# Patient Record
Sex: MALE | Race: BLACK OR AFRICAN AMERICAN | NOT HISPANIC OR LATINO | Employment: FULL TIME | URBAN - METROPOLITAN AREA
[De-identification: names, ages, dates, MRNs, and addresses within clinical notes are randomized per-mention and may not be internally consistent; named-entity substitution may affect disease eponyms.]

---

## 2024-09-09 ENCOUNTER — APPOINTMENT (EMERGENCY)
Dept: RADIOLOGY | Facility: HOSPITAL | Age: 34
DRG: 390 | End: 2024-09-09
Payer: COMMERCIAL

## 2024-09-09 ENCOUNTER — HOSPITAL ENCOUNTER (INPATIENT)
Facility: HOSPITAL | Age: 34
LOS: 1 days | Discharge: ANOTHER HOSPITAL | DRG: 390 | End: 2024-09-10
Attending: EMERGENCY MEDICINE | Admitting: SURGERY
Payer: COMMERCIAL

## 2024-09-09 DIAGNOSIS — K56.609 SMALL BOWEL OBSTRUCTION (HCC): Primary | ICD-10-CM

## 2024-09-09 DIAGNOSIS — Z90.49 S/P PARTIAL RESECTION OF COLON: ICD-10-CM

## 2024-09-09 LAB
ANION GAP SERPL CALCULATED.3IONS-SCNC: 6 MMOL/L (ref 4–13)
BASOPHILS # BLD AUTO: 0.05 THOUSANDS/ÂΜL (ref 0–0.1)
BASOPHILS NFR BLD AUTO: 1 % (ref 0–1)
BUN SERPL-MCNC: 10 MG/DL (ref 5–25)
CALCIUM SERPL-MCNC: 10 MG/DL (ref 8.4–10.2)
CHLORIDE SERPL-SCNC: 101 MMOL/L (ref 96–108)
CO2 SERPL-SCNC: 28 MMOL/L (ref 21–32)
CREAT SERPL-MCNC: 1.16 MG/DL (ref 0.6–1.3)
EOSINOPHIL # BLD AUTO: 0.03 THOUSAND/ÂΜL (ref 0–0.61)
EOSINOPHIL NFR BLD AUTO: 0 % (ref 0–6)
ERYTHROCYTE [DISTWIDTH] IN BLOOD BY AUTOMATED COUNT: 14 % (ref 11.6–15.1)
GFR SERPL CREATININE-BSD FRML MDRD: 81 ML/MIN/1.73SQ M
GLUCOSE SERPL-MCNC: 102 MG/DL (ref 65–140)
HCT VFR BLD AUTO: 42.5 % (ref 36.5–49.3)
HGB BLD-MCNC: 13.6 G/DL (ref 12–17)
IMM GRANULOCYTES # BLD AUTO: 0.02 THOUSAND/UL (ref 0–0.2)
IMM GRANULOCYTES NFR BLD AUTO: 0 % (ref 0–2)
LACTATE SERPL-SCNC: 0.6 MMOL/L (ref 0.5–2)
LYMPHOCYTES # BLD AUTO: 1.22 THOUSANDS/ÂΜL (ref 0.6–4.47)
LYMPHOCYTES NFR BLD AUTO: 12 % (ref 14–44)
MCH RBC QN AUTO: 23.4 PG (ref 26.8–34.3)
MCHC RBC AUTO-ENTMCNC: 32 G/DL (ref 31.4–37.4)
MCV RBC AUTO: 73 FL (ref 82–98)
MONOCYTES # BLD AUTO: 0.72 THOUSAND/ÂΜL (ref 0.17–1.22)
MONOCYTES NFR BLD AUTO: 7 % (ref 4–12)
NEUTROPHILS # BLD AUTO: 7.95 THOUSANDS/ÂΜL (ref 1.85–7.62)
NEUTS SEG NFR BLD AUTO: 80 % (ref 43–75)
NRBC BLD AUTO-RTO: 0 /100 WBCS
PLATELET # BLD AUTO: 200 THOUSANDS/UL (ref 149–390)
PMV BLD AUTO: 10.5 FL (ref 8.9–12.7)
POTASSIUM SERPL-SCNC: 4.8 MMOL/L (ref 3.5–5.3)
RBC # BLD AUTO: 5.82 MILLION/UL (ref 3.88–5.62)
SODIUM SERPL-SCNC: 135 MMOL/L (ref 135–147)
WBC # BLD AUTO: 9.99 THOUSAND/UL (ref 4.31–10.16)

## 2024-09-09 PROCEDURE — 96375 TX/PRO/DX INJ NEW DRUG ADDON: CPT

## 2024-09-09 PROCEDURE — 80048 BASIC METABOLIC PNL TOTAL CA: CPT | Performed by: EMERGENCY MEDICINE

## 2024-09-09 PROCEDURE — 99285 EMERGENCY DEPT VISIT HI MDM: CPT | Performed by: EMERGENCY MEDICINE

## 2024-09-09 PROCEDURE — 99284 EMERGENCY DEPT VISIT MOD MDM: CPT

## 2024-09-09 PROCEDURE — 36415 COLL VENOUS BLD VENIPUNCTURE: CPT | Performed by: EMERGENCY MEDICINE

## 2024-09-09 PROCEDURE — 83605 ASSAY OF LACTIC ACID: CPT | Performed by: PHYSICIAN ASSISTANT

## 2024-09-09 PROCEDURE — 74177 CT ABD & PELVIS W/CONTRAST: CPT

## 2024-09-09 PROCEDURE — 99223 1ST HOSP IP/OBS HIGH 75: CPT | Performed by: SPECIALIST

## 2024-09-09 PROCEDURE — 85025 COMPLETE CBC W/AUTO DIFF WBC: CPT | Performed by: EMERGENCY MEDICINE

## 2024-09-09 PROCEDURE — 96374 THER/PROPH/DIAG INJ IV PUSH: CPT

## 2024-09-09 RX ORDER — ONDANSETRON 2 MG/ML
4 INJECTION INTRAMUSCULAR; INTRAVENOUS EVERY 6 HOURS PRN
Status: DISCONTINUED | OUTPATIENT
Start: 2024-09-09 | End: 2024-09-11 | Stop reason: HOSPADM

## 2024-09-09 RX ORDER — ONDANSETRON 2 MG/ML
4 INJECTION INTRAMUSCULAR; INTRAVENOUS ONCE
Status: COMPLETED | OUTPATIENT
Start: 2024-09-09 | End: 2024-09-09

## 2024-09-09 RX ORDER — HYDROMORPHONE HCL/PF 1 MG/ML
0.5 SYRINGE (ML) INJECTION EVERY 4 HOURS PRN
Status: DISCONTINUED | OUTPATIENT
Start: 2024-09-09 | End: 2024-09-11 | Stop reason: HOSPADM

## 2024-09-09 RX ORDER — SODIUM CHLORIDE, SODIUM GLUCONATE, SODIUM ACETATE, POTASSIUM CHLORIDE, MAGNESIUM CHLORIDE, SODIUM PHOSPHATE, DIBASIC, AND POTASSIUM PHOSPHATE .53; .5; .37; .037; .03; .012; .00082 G/100ML; G/100ML; G/100ML; G/100ML; G/100ML; G/100ML; G/100ML
125 INJECTION, SOLUTION INTRAVENOUS CONTINUOUS
Status: DISCONTINUED | OUTPATIENT
Start: 2024-09-09 | End: 2024-09-11 | Stop reason: HOSPADM

## 2024-09-09 RX ORDER — MORPHINE SULFATE 4 MG/ML
4 INJECTION, SOLUTION INTRAMUSCULAR; INTRAVENOUS ONCE
Status: COMPLETED | OUTPATIENT
Start: 2024-09-09 | End: 2024-09-09

## 2024-09-09 RX ORDER — SODIUM CHLORIDE 9 MG/ML
100 INJECTION, SOLUTION INTRAVENOUS CONTINUOUS
Status: DISCONTINUED | OUTPATIENT
Start: 2024-09-09 | End: 2024-09-09

## 2024-09-09 RX ORDER — MORPHINE SULFATE 4 MG/ML
4 INJECTION, SOLUTION INTRAMUSCULAR; INTRAVENOUS ONCE
Status: DISCONTINUED | OUTPATIENT
Start: 2024-09-09 | End: 2024-09-09

## 2024-09-09 RX ORDER — HYDROMORPHONE HCL IN WATER/PF 6 MG/30 ML
0.2 PATIENT CONTROLLED ANALGESIA SYRINGE INTRAVENOUS EVERY 4 HOURS PRN
Status: DISCONTINUED | OUTPATIENT
Start: 2024-09-09 | End: 2024-09-11 | Stop reason: HOSPADM

## 2024-09-09 RX ORDER — ACETAMINOPHEN 10 MG/ML
1000 INJECTION, SOLUTION INTRAVENOUS EVERY 6 HOURS PRN
Status: DISCONTINUED | OUTPATIENT
Start: 2024-09-09 | End: 2024-09-11 | Stop reason: HOSPADM

## 2024-09-09 RX ORDER — ENOXAPARIN SODIUM 100 MG/ML
40 INJECTION SUBCUTANEOUS DAILY
Status: DISCONTINUED | OUTPATIENT
Start: 2024-09-09 | End: 2024-09-11 | Stop reason: HOSPADM

## 2024-09-09 RX ADMIN — SODIUM CHLORIDE 100 ML/HR: 0.9 INJECTION, SOLUTION INTRAVENOUS at 06:17

## 2024-09-09 RX ADMIN — MORPHINE SULFATE 4 MG: 4 INJECTION INTRAVENOUS at 04:23

## 2024-09-09 RX ADMIN — HYDROMORPHONE HYDROCHLORIDE 0.2 MG: 0.2 INJECTION, SOLUTION INTRAMUSCULAR; INTRAVENOUS; SUBCUTANEOUS at 20:58

## 2024-09-09 RX ADMIN — IOHEXOL 100 ML: 350 INJECTION, SOLUTION INTRAVENOUS at 03:57

## 2024-09-09 RX ADMIN — ACETAMINOPHEN 1000 MG: 10 INJECTION INTRAVENOUS at 12:08

## 2024-09-09 RX ADMIN — SODIUM CHLORIDE, SODIUM GLUCONATE, SODIUM ACETATE, POTASSIUM CHLORIDE, MAGNESIUM CHLORIDE, SODIUM PHOSPHATE, DIBASIC, AND POTASSIUM PHOSPHATE 125 ML/HR: .53; .5; .37; .037; .03; .012; .00082 INJECTION, SOLUTION INTRAVENOUS at 20:49

## 2024-09-09 RX ADMIN — ONDANSETRON 4 MG: 2 INJECTION INTRAMUSCULAR; INTRAVENOUS at 20:50

## 2024-09-09 RX ADMIN — SODIUM CHLORIDE, SODIUM GLUCONATE, SODIUM ACETATE, POTASSIUM CHLORIDE, MAGNESIUM CHLORIDE, SODIUM PHOSPHATE, DIBASIC, AND POTASSIUM PHOSPHATE 125 ML/HR: .53; .5; .37; .037; .03; .012; .00082 INJECTION, SOLUTION INTRAVENOUS at 12:08

## 2024-09-09 RX ADMIN — ONDANSETRON 4 MG: 2 INJECTION INTRAMUSCULAR; INTRAVENOUS at 02:25

## 2024-09-09 RX ADMIN — ONDANSETRON 4 MG: 2 INJECTION INTRAMUSCULAR; INTRAVENOUS at 12:51

## 2024-09-09 RX ADMIN — MORPHINE SULFATE 2 MG: 2 INJECTION, SOLUTION INTRAMUSCULAR; INTRAVENOUS at 08:28

## 2024-09-09 RX ADMIN — HYDROMORPHONE HYDROCHLORIDE 0.5 MG: 1 INJECTION, SOLUTION INTRAMUSCULAR; INTRAVENOUS; SUBCUTANEOUS at 18:32

## 2024-09-09 NOTE — ED NOTES
Patient received in room, resting, awaiting bed on unit upstairs.Patient refused NGT for night shift nurse, and doctors are aware.     Casie Martin RN  09/09/24 3655

## 2024-09-09 NOTE — PLAN OF CARE
Problem: PAIN - ADULT  Goal: Verbalizes/displays adequate comfort level or baseline comfort level  Description: Interventions:  - Encourage patient to monitor pain and request assistance  - Assess pain using appropriate pain scale  - Administer analgesics based on type and severity of pain and evaluate response  - Implement non-pharmacological measures as appropriate and evaluate response  - Consider cultural and social influences on pain and pain management  - Notify physician/advanced practitioner if interventions unsuccessful or patient reports new pain  Outcome: Progressing     Problem: SAFETY ADULT  Goal: Patient will remain free of falls  Description: INTERVENTIONS:  - Educate patient/family on patient safety including physical limitations  - Instruct patient to call for assistance with activity   - Consult OT/PT to assist with strengthening/mobility   - Keep Call bell within reach  - Keep bed low and locked with side rails adjusted as appropriate  - Keep care items and personal belongings within reach  - Initiate and maintain comfort rounds  - Make Fall Risk Sign visible to staff  - Offer Toileting every 2 Hours, in advance of need  - Obtain necessary fall risk management equipment: Call bell  - Apply yellow socks and bracelet for high fall risk patients  - Consider moving patient to room near nurses station  Outcome: Progressing  Goal: Maintain or return to baseline ADL function  Description: INTERVENTIONS:  -  Assess patient's ability to carry out ADLs; assess patient's baseline for ADL function and identify physical deficits which impact ability to perform ADLs (bathing, care of mouth/teeth, toileting, grooming, dressing, etc.)  - Assess/evaluate cause of self-care deficits   - Assess range of motion  - Assess patient's mobility; develop plan if impaired  - Assess patient's need for assistive devices and provide as appropriate  - Encourage maximum independence but intervene and supervise when  necessary  - Involve family in performance of ADLs  - Assess for home care needs following discharge   - Consider OT consult to assist with ADL evaluation and planning for discharge  - Provide patient education as appropriate  Outcome: Progressing  Goal: Maintains/Returns to pre admission functional level  Description: INTERVENTIONS:  - Perform AM-PAC 6 Click Basic Mobility/ Daily Activity assessment daily.  - Set and communicate daily mobility goal to care team and patient/family/caregiver.   - Collaborate with rehabilitation services on mobility goals if consulted  - Out of bed for toileting  - Record patient progress and toleration of activity level   Outcome: Progressing     Problem: DISCHARGE PLANNING  Goal: Discharge to home or other facility with appropriate resources  Description: INTERVENTIONS:  - Identify barriers to discharge w/patient and caregiver  - Arrange for needed discharge resources and transportation as appropriate  - Identify discharge learning needs (meds, wound care, etc.)  - Arrange for interpretive services to assist at discharge as needed  - Refer to Case Management Department for coordinating discharge planning if the patient needs post-hospital services based on physician/advanced practitioner order or complex needs related to functional status, cognitive ability, or social support system  Outcome: Progressing     Problem: Knowledge Deficit  Goal: Patient/family/caregiver demonstrates understanding of disease process, treatment plan, medications, and discharge instructions  Description: Complete learning assessment and assess knowledge base.  Interventions:  - Provide teaching at level of understanding  - Provide teaching via preferred learning methods  Outcome: Progressing

## 2024-09-09 NOTE — ED PROVIDER NOTES
History  Chief Complaint   Patient presents with    Abdominal Pain     Started with l sided abd pain around 1800 with nausea and vomiting, had colon surgery 2 years ago. Hx of sbo, sts feels like he has another obstruction. No pain meds taken     34-year-old male past medical history significant for colon resection status post as well as history of small bowel obstructions presenting to ED today with left-sided pain that started around 6 PM.  He was also having nausea and dry heaving.  Says that it feels like he is having another obstruction.  Did not take any pain meds prior to coming in.        None       No past medical history on file.    No past surgical history on file.    No family history on file.  I have reviewed and agree with the history as documented.    E-Cigarette/Vaping     E-Cigarette/Vaping Substances    Nicotine Yes      Social History     Tobacco Use    Smoking status: Never   Substance Use Topics    Alcohol use: Yes     Comment: occ    Drug use: Not Currently     Types: Marijuana       Review of Systems   Constitutional:  Negative for chills and fever.   HENT:  Negative for hearing loss.    Eyes:  Negative for visual disturbance.   Respiratory:  Negative for shortness of breath.    Cardiovascular:  Negative for chest pain.   Gastrointestinal:  Positive for abdominal pain, nausea and vomiting. Negative for constipation and diarrhea.   Genitourinary:  Negative for difficulty urinating.   Musculoskeletal:  Negative for myalgias.   Skin:  Negative for rash.   Neurological:  Negative for dizziness.   Psychiatric/Behavioral:  Negative for agitation.        Physical Exam  Physical Exam  Vitals and nursing note reviewed.   Constitutional:       General: He is not in acute distress.     Appearance: Normal appearance. He is not ill-appearing.   HENT:      Head: Normocephalic and atraumatic.      Right Ear: External ear normal.      Left Ear: External ear normal.      Nose: Nose normal. No congestion.       Mouth/Throat:      Mouth: Mucous membranes are moist.      Pharynx: Oropharynx is clear. No oropharyngeal exudate.   Eyes:      General:         Right eye: No discharge.         Left eye: No discharge.      Extraocular Movements: Extraocular movements intact.      Conjunctiva/sclera: Conjunctivae normal.      Pupils: Pupils are equal, round, and reactive to light.   Cardiovascular:      Rate and Rhythm: Normal rate and regular rhythm.      Pulses: Normal pulses.      Heart sounds: Normal heart sounds.   Pulmonary:      Effort: Pulmonary effort is normal. No respiratory distress.      Breath sounds: Normal breath sounds. No wheezing.   Abdominal:      General: Abdomen is flat. Bowel sounds are normal. There is no distension.      Palpations: Abdomen is soft.      Tenderness: There is abdominal tenderness in the left upper quadrant. There is no guarding or rebound.   Musculoskeletal:         General: No swelling or deformity. Normal range of motion.      Cervical back: Normal range of motion. No rigidity.   Skin:     General: Skin is warm and dry.      Capillary Refill: Capillary refill takes less than 2 seconds.   Neurological:      General: No focal deficit present.      Mental Status: He is alert and oriented to person, place, and time. Mental status is at baseline.      Cranial Nerves: No cranial nerve deficit.      Motor: No weakness.      Gait: Gait normal.   Psychiatric:         Mood and Affect: Mood normal.         Behavior: Behavior normal.         Vital Signs  ED Triage Vitals [09/09/24 0216]   Temperature Pulse Respirations Blood Pressure SpO2   98.2 °F (36.8 °C) (!) 53 18 133/61 100 %      Temp Source Heart Rate Source Patient Position - Orthostatic VS BP Location FiO2 (%)   Oral Monitor Lying Left arm --      Pain Score       4           Vitals:    09/09/24 0216   BP: 133/61   Pulse: (!) 53   Patient Position - Orthostatic VS: Lying         Visual Acuity      ED Medications  Medications   ondansetron  (ZOFRAN) injection 4 mg (4 mg Intravenous Given 9/9/24 0225)   iohexol (OMNIPAQUE) 350 MG/ML injection (MULTI-DOSE) 100 mL (100 mL Intravenous Given 9/9/24 5867)   morphine injection 4 mg (4 mg Intravenous Given 9/9/24 1883)       Diagnostic Studies  Results Reviewed       Procedure Component Value Units Date/Time    Basic metabolic panel [[id number]] Collected: 09/09/24 0224    Lab Status: Final result Specimen: Blood from Arm, Right Updated: 09/09/24 0255     Sodium 135 mmol/L      Potassium 4.8 mmol/L      Chloride 101 mmol/L      CO2 28 mmol/L      ANION GAP 6 mmol/L      BUN 10 mg/dL      Creatinine 1.16 mg/dL      Glucose 102 mg/dL      Calcium 10.0 mg/dL      eGFR 81 ml/min/1.73sq m     Narrative:      National Kid[employer] Foundation guidelines for Chronic Kidney Disease (CKD):     Stage 1 with normal or high GFR (GFR > 90 mL/min/1.73 square meters)    Stage 2 Mild CKD (GFR = 60-89 mL/min/1.73 square meters)    Stage 3A Moderate CKD (GFR = 45-59 mL/min/1.73 square meters)    Stage 3B Moderate CKD (GFR = 30-44 mL/min/1.73 square meters)    Stage 4 Severe CKD (GFR = 15-29 mL/min/1.73 square meters)    Stage 5 End Stage CKD (GFR <15 mL/min/1.73 square meters)  Note: GFR calculation is accurate only with a steady state creatinine    CBC and differential [[id number]]  (Abnormal) Collected: 09/09/24 0224    Lab Status: Final result Specimen: Blood from Arm, Right Updated: 09/09/24 0229     WBC 9.99 Thousand/uL      RBC 5.82 Million/uL      Hemoglobin 13.6 g/dL      Hematocrit 42.5 %      MCV 73 fL      MCH 23.4 pg      MCHC 32.0 g/dL      RDW 14.0 %      MPV 10.5 fL      Platelets 200 Thousands/uL      nRBC 0 /100 WBCs      Segmented % 80 %      Immature Grans % 0 %      Lymphocytes % 12 %      Monocytes % 7 %      Eosinophils Relative 0 %      Basophils Relative 1 %      Absolute Neutrophils 7.95 Thousands/µL      Absolute Immature Grans 0.02 Thousand/uL      Absolute Lymphocytes 1.22 Thousands/µL      Absolute  Monocytes 0.72 Thousand/µL      Eosinophils Absolute 0.03 Thousand/µL      Basophils Absolute 0.05 Thousands/µL                    CT abdomen pelvis with contrast   ED Interpretation by Edd Serna MD (09/09 0410)   Abnormal   Per my interpretation, Dilated bowel loops concerned for SBO. Will await formal RADs Reading.      Final Result by Nolvia Zayas MD (09/09 0529)      Small bowel obstruction, as described above. Please see discussion. The appearance is concerning for possible closed loop obstruction, possibly related to internal hernia and/or volvulus. Surgical consultation and follow-up is recommended.         I personally discussed this study with EDD SERNA on 9/9/2024 5:14 AM.               Workstation performed: [id number]                    Procedures  Procedures         ED Course  ED Course as of 09/09/24 0543   Mon Sep 09, 2024   0517 Rads Call with Dr. Zayas: Small Bowel obstruction loops measuring up to 4cm diameter with a transition zone left of midline mid abdomen. Swirling of the mesenteric vessels Internal hernia or volvus. With free fluid. No pneumatosis                                  SBIRT 20yo+      Flowsheet Row Most Recent Value   Initial Alcohol Screen: US AUDIT-C     1. How often do you have a drink containing alcohol? 0 Filed at: 09/09/2024 0219   2. How many drinks containing alcohol do you have on a typical day you are drinking?  0 Filed at: 09/09/2024 0408   3a. Male UNDER 65: How often do you have five or more drinks on one occasion? 0 Filed at: 09/09/2024 0408   Audit-C Score 0 Filed at: 09/09/2024 0408   JOURDAN: How many times in the past year have you...    Used an illegal drug or used a prescription medication for non-medical reasons? Never Filed at: 09/09/2024 0408                      Medical Decision Making  34-year-old male presenting to ED today with left upper quadrant abdominal pain.  Differential includes gastritis versus small bowel obstruction versus abdominal  cramping secondary to viral process.  Will evaluate this time with a CBC, BMP, CT and pelvis with IV contrast.  Will give IV Zofran for nausea as well as IV morphine for pain.    Amount and/or Complexity of Data Reviewed  Labs: ordered.  Radiology: ordered and independent interpretation performed.    Risk  Prescription drug management.  Decision regarding hospitalization.                 Disposition  Final diagnoses:   Small bowel obstruction (HCC)   S/P partial resection of colon     Time reflects when diagnosis was documented in both MDM as applicable and the Disposition within this note       Time User Action Codes Description Comment    9/9/2024  5:23 AM Edd Dior [K56.609] Small bowel obstruction (HCC)     9/9/2024  5:23 AM Edd Dior [Z90.49] S/P partial resection of colon           ED Disposition       ED Disposition   Admit    Condition   Stable    Date/Time   Mon Sep 9, 2024 0542    Comment   Case was discussed with Dr. Dela Cruz and Dr. Huston and the patient's admission status was agreed to be Admission Status: inpatient status to the service of Dr. Huston .               Follow-up Information    None         Patient's Medications    No medications on file       No discharge procedures on file.    PDMP Review       None            ED Provider  Electronically Signed by             Edd Dior MD  09/09/24 0546

## 2024-09-09 NOTE — H&P
H&P Exam - General Surgery   Joaquín Gee 34 y.o. male MRN: [id number]  Unit/Bed#: ED 01 Encounter: [id number]    Assessment & Plan     Assessment:  This is a 34-year-old male past medical history of: CA status post right colon resection in 2021 presenting with left lower quadrant abdominal pain with associated nausea vomiting.  Patient reports history of small bowel obstruction x 2.  Most recent obstruction earlier this year.    CT a/p:   Prior partial colectomy. Anastomotic sutures are present in the right mid to lower abdomen. The stomach is distended with fluid. There are multiple loops of abnormally dilated fluid-filled small bowel, measuring up to 4 cm diameter.   There is some early fecalization of small bowel contents in the right upper to mid abdomen. Some of the small bowel loops demonstrate mild wall thickening and mild hyperemia. Scattered air-fluid levels are present. There appears to be a transition zone in the left mid abdomen (series 2 image 81, series 601 image 51). There also appears to be another transition zone in the right mid abdomen (series 2 image 91-99, series 601 image 48-36). There is swirling of the mesenteric vessels slightly to the left of midline. The mesenteric vessels appear engorged. There is no definite evidence of pneumatosis intestinalis.     No leukocytosis. AVSS.       Plan:  NPO.   Patient refused NGT. If nausea/vomiting will need NGT.   IVF.   PRN analgesics.   AM labs CBC, CMP, mag, phos.     History of Present Illness     HPI:  Joaquín Gee is a 34 y.o. male  male past medical history of: CA status post right colon resection in 2021 presenting with left lower quadrant abdominal pain with associated nausea vomiting.  Patient reports history of small bowel obstruction x 2.  Most recent obstruction earlier this year. Patient reports abdominal pain started around 6pm yesterday. Patient reports one episode of emesis after eating oatmeal. Denies any chest pain  "shortness of breath. Feels similar to previous obstructions in the past. Patient reports he is not bloated and now is not nauseous.     Review of Systems   Constitutional:  Negative for chills, fatigue and fever.   HENT:  Negative for congestion, ear pain, hearing loss, postnasal drip, sinus pressure, sinus pain and sore throat.    Eyes:  Negative for pain and discharge.   Respiratory:  Negative for chest tightness and shortness of breath.    Cardiovascular:  Negative for chest pain.   Gastrointestinal:  Positive for abdominal pain, nausea and vomiting. Negative for constipation.   Genitourinary:  Negative for difficulty urinating.   Musculoskeletal:  Negative for arthralgias and myalgias.   Skin:  Negative for rash.   Neurological:  Negative for dizziness and headaches.   Psychiatric/Behavioral:  Negative for behavioral problems.        Historical Information   No past medical history on file.  No past surgical history on file.  Social History   Social History     Substance and Sexual Activity   Alcohol Use Yes    Comment: occ     Social History     Substance and Sexual Activity   Drug Use Not Currently    Types: Marijuana     Social History     Tobacco Use   Smoking Status Never   Smokeless Tobacco Not on file     E-Cigarette/Vaping     E-Cigarette/Vaping Substances    Nicotine Yes      Family History: non-contributory    Meds/Allergies   all medications and allergies reviewed  No Known Allergies    Objective   First Vitals:   Blood Pressure: 133/61 (09/09/24 0216)  Pulse: (!) 53 (09/09/24 0216)  Temperature: 98.2 °F (36.8 °C) (09/09/24 0216)  Temp Source: Oral (09/09/24 0216)  Respirations: 18 (09/09/24 0216)  Height: 5' 6\" (167.6 cm) (09/09/24 0216)  Weight - Scale: 55.9 kg (123 lb 3.2 oz) (09/09/24 0216)  SpO2: 100 % (09/09/24 0216)    Current Vitals:   Blood Pressure: 116/56 (09/09/24 0624)  Pulse: 56 (09/09/24 0624)  Temperature: 98.3 °F (36.8 °C) (09/09/24 0624)  Temp Source: Oral (09/09/24 " "0624)  Respirations: 18 (09/09/24 0624)  Height: 5' 6\" (167.6 cm) (09/09/24 0216)  Weight - Scale: 55.9 kg (123 lb 3.2 oz) (09/09/24 0216)  SpO2: 98 % (09/09/24 0624)    No intake or output data in the 24 hours ending 09/09/24 0749    Invasive Devices       Peripheral Intravenous Line  Duration             Peripheral IV 09/09/24 Right Antecubital <1 day                    Physical Exam  Constitutional:       General: He is not in acute distress.     Appearance: Normal appearance. He is not ill-appearing.   HENT:      Head: Normocephalic and atraumatic.      Nose: Nose normal.      Mouth/Throat:      Mouth: Mucous membranes are moist.   Eyes:      Extraocular Movements: Extraocular movements intact.   Cardiovascular:      Rate and Rhythm: Bradycardia present.      Pulses: Normal pulses.   Pulmonary:      Effort: Pulmonary effort is normal. No respiratory distress.   Abdominal:      General: A surgical scar is present. There is no distension.      Palpations: Abdomen is soft.      Tenderness: There is abdominal tenderness in the left upper quadrant and left lower quadrant. There is no guarding or rebound.      Hernia: No hernia is present.   Neurological:      Mental Status: He is alert.         Lab Results: I have personally reviewed pertinent lab results.  , CBC:   Lab Results   Component Value Date    WBC 9.99 09/09/2024    HGB 13.6 09/09/2024    HCT 42.5 09/09/2024    MCV 73 (L) 09/09/2024     09/09/2024    RBC 5.82 (H) 09/09/2024    MCH 23.4 (L) 09/09/2024    MCHC 32.0 09/09/2024    RDW 14.0 09/09/2024    MPV 10.5 09/09/2024    NRBC 0 09/09/2024   , CMP:   Lab Results   Component Value Date    SODIUM 135 09/09/2024    K 4.8 09/09/2024     09/09/2024    CO2 28 09/09/2024    BUN 10 09/09/2024    CREATININE 1.16 09/09/2024    CALCIUM 10.0 09/09/2024    EGFR 81 09/09/2024     Imaging: I have personally reviewed pertinent reports.   and I have personally reviewed pertinent films in PACS  CT abdomen pelvis " with contrast   ED Interpretation by Edd Serna MD (09/09 0410)   Abnormal   Per my interpretation, Dilated bowel loops concerned for SBO. Will await formal RADs Reading.      Final Result by Nolvia Zayas MD (09/09 0529)      Small bowel obstruction, as described above. Please see discussion. The appearance is concerning for possible closed loop obstruction, possibly related to internal hernia and/or volvulus. Surgical consultation and follow-up is recommended.         I personally discussed this study with EDD SERNA on 9/9/2024 5:14 AM.               Workstation performed: [id number]             EKG, Pathology, and Other Studies: I have personally reviewed pertinent reports.          Counseling / Coordination of Care  Total floor / unit time spent today 30 minutes.  Greater than 50% of total time was spent with the patient and / or family counseling and / or coordination of care.  A description of the counseling / coordination of care: obtaining history, performing physical exam, reviewing pertinent labs imaging, discussing case with attending.    .

## 2024-09-09 NOTE — Clinical Note
Case was discussed with Dr. Dela Cruz and the patient's admission status was agreed to be Admission Status: inpatient status to the service of Dr. Dela Cruz .

## 2024-09-10 ENCOUNTER — APPOINTMENT (INPATIENT)
Dept: RADIOLOGY | Facility: HOSPITAL | Age: 34
DRG: 390 | End: 2024-09-10
Payer: COMMERCIAL

## 2024-09-10 VITALS
HEART RATE: 57 BPM | SYSTOLIC BLOOD PRESSURE: 125 MMHG | WEIGHT: 124.6 LBS | HEIGHT: 66 IN | TEMPERATURE: 98.6 F | DIASTOLIC BLOOD PRESSURE: 70 MMHG | BODY MASS INDEX: 20.03 KG/M2 | OXYGEN SATURATION: 100 % | RESPIRATION RATE: 18 BRPM

## 2024-09-10 PROBLEM — Z90.49 S/P RIGHT HEMICOLECTOMY: Status: ACTIVE | Noted: 2024-09-10

## 2024-09-10 PROBLEM — K56.50 SMALL BOWEL OBSTRUCTION DUE TO ADHESIONS (HCC): Status: ACTIVE | Noted: 2024-09-10

## 2024-09-10 LAB
ALBUMIN SERPL BCG-MCNC: 4.1 G/DL (ref 3.5–5)
ALP SERPL-CCNC: 39 U/L (ref 34–104)
ALT SERPL W P-5'-P-CCNC: 12 U/L (ref 7–52)
ANION GAP SERPL CALCULATED.3IONS-SCNC: 9 MMOL/L (ref 4–13)
AST SERPL W P-5'-P-CCNC: 17 U/L (ref 13–39)
BILIRUB SERPL-MCNC: 1.78 MG/DL (ref 0.2–1)
BUN SERPL-MCNC: 14 MG/DL (ref 5–25)
CALCIUM SERPL-MCNC: 9.1 MG/DL (ref 8.4–10.2)
CHLORIDE SERPL-SCNC: 98 MMOL/L (ref 96–108)
CO2 SERPL-SCNC: 27 MMOL/L (ref 21–32)
CREAT SERPL-MCNC: 1.01 MG/DL (ref 0.6–1.3)
ERYTHROCYTE [DISTWIDTH] IN BLOOD BY AUTOMATED COUNT: 13.8 % (ref 11.6–15.1)
GFR SERPL CREATININE-BSD FRML MDRD: 95 ML/MIN/1.73SQ M
GLUCOSE SERPL-MCNC: 105 MG/DL (ref 65–140)
HCT VFR BLD AUTO: 40.8 % (ref 36.5–49.3)
HGB BLD-MCNC: 13 G/DL (ref 12–17)
MAGNESIUM SERPL-MCNC: 1.9 MG/DL (ref 1.9–2.7)
MCH RBC QN AUTO: 23.1 PG (ref 26.8–34.3)
MCHC RBC AUTO-ENTMCNC: 31.9 G/DL (ref 31.4–37.4)
MCV RBC AUTO: 73 FL (ref 82–98)
PHOSPHATE SERPL-MCNC: 4.3 MG/DL (ref 2.7–4.5)
PLATELET # BLD AUTO: 202 THOUSANDS/UL (ref 149–390)
PMV BLD AUTO: 10.6 FL (ref 8.9–12.7)
POTASSIUM SERPL-SCNC: 4.3 MMOL/L (ref 3.5–5.3)
PROT SERPL-MCNC: 6.8 G/DL (ref 6.4–8.4)
RBC # BLD AUTO: 5.63 MILLION/UL (ref 3.88–5.62)
SODIUM SERPL-SCNC: 134 MMOL/L (ref 135–147)
WBC # BLD AUTO: 6.95 THOUSAND/UL (ref 4.31–10.16)

## 2024-09-10 PROCEDURE — 85027 COMPLETE CBC AUTOMATED: CPT | Performed by: PHYSICIAN ASSISTANT

## 2024-09-10 PROCEDURE — 80053 COMPREHEN METABOLIC PANEL: CPT | Performed by: PHYSICIAN ASSISTANT

## 2024-09-10 PROCEDURE — 99238 HOSP IP/OBS DSCHRG MGMT 30/<: CPT | Performed by: PHYSICIAN ASSISTANT

## 2024-09-10 PROCEDURE — 71045 X-RAY EXAM CHEST 1 VIEW: CPT

## 2024-09-10 PROCEDURE — 99233 SBSQ HOSP IP/OBS HIGH 50: CPT | Performed by: SPECIALIST

## 2024-09-10 PROCEDURE — 84100 ASSAY OF PHOSPHORUS: CPT | Performed by: PHYSICIAN ASSISTANT

## 2024-09-10 PROCEDURE — 83735 ASSAY OF MAGNESIUM: CPT | Performed by: PHYSICIAN ASSISTANT

## 2024-09-10 RX ORDER — XYLITOL/YERBA SANTA
5 AEROSOL, SPRAY WITH PUMP (ML) MUCOUS MEMBRANE 4 TIMES DAILY PRN
Status: DISCONTINUED | OUTPATIENT
Start: 2024-09-10 | End: 2024-09-11 | Stop reason: HOSPADM

## 2024-09-10 RX ADMIN — HYDROMORPHONE HYDROCHLORIDE 0.2 MG: 0.2 INJECTION, SOLUTION INTRAMUSCULAR; INTRAVENOUS; SUBCUTANEOUS at 03:20

## 2024-09-10 RX ADMIN — ONDANSETRON 4 MG: 2 INJECTION INTRAMUSCULAR; INTRAVENOUS at 11:04

## 2024-09-10 RX ADMIN — HYDROMORPHONE HYDROCHLORIDE 0.5 MG: 1 INJECTION, SOLUTION INTRAMUSCULAR; INTRAVENOUS; SUBCUTANEOUS at 10:55

## 2024-09-10 RX ADMIN — HYDROMORPHONE HYDROCHLORIDE 0.5 MG: 1 INJECTION, SOLUTION INTRAMUSCULAR; INTRAVENOUS; SUBCUTANEOUS at 00:45

## 2024-09-10 RX ADMIN — Medication 5 SPRAY: at 19:52

## 2024-09-10 RX ADMIN — HYDROMORPHONE HYDROCHLORIDE 0.2 MG: 0.2 INJECTION, SOLUTION INTRAMUSCULAR; INTRAVENOUS; SUBCUTANEOUS at 13:07

## 2024-09-10 RX ADMIN — HYDROMORPHONE HYDROCHLORIDE 0.5 MG: 1 INJECTION, SOLUTION INTRAMUSCULAR; INTRAVENOUS; SUBCUTANEOUS at 19:44

## 2024-09-10 RX ADMIN — HYDROMORPHONE HYDROCHLORIDE 0.2 MG: 0.2 INJECTION, SOLUTION INTRAMUSCULAR; INTRAVENOUS; SUBCUTANEOUS at 08:30

## 2024-09-10 RX ADMIN — HYDROMORPHONE HYDROCHLORIDE 0.5 MG: 1 INJECTION, SOLUTION INTRAMUSCULAR; INTRAVENOUS; SUBCUTANEOUS at 16:05

## 2024-09-10 RX ADMIN — HYDROMORPHONE HYDROCHLORIDE 0.5 MG: 1 INJECTION, SOLUTION INTRAMUSCULAR; INTRAVENOUS; SUBCUTANEOUS at 05:54

## 2024-09-10 RX ADMIN — SODIUM CHLORIDE, SODIUM GLUCONATE, SODIUM ACETATE, POTASSIUM CHLORIDE, MAGNESIUM CHLORIDE, SODIUM PHOSPHATE, DIBASIC, AND POTASSIUM PHOSPHATE 125 ML/HR: .53; .5; .37; .037; .03; .012; .00082 INJECTION, SOLUTION INTRAVENOUS at 16:10

## 2024-09-10 RX ADMIN — HYDROMORPHONE HYDROCHLORIDE 0.2 MG: 0.2 INJECTION, SOLUTION INTRAMUSCULAR; INTRAVENOUS; SUBCUTANEOUS at 17:33

## 2024-09-10 RX ADMIN — SODIUM CHLORIDE, SODIUM GLUCONATE, SODIUM ACETATE, POTASSIUM CHLORIDE, MAGNESIUM CHLORIDE, SODIUM PHOSPHATE, DIBASIC, AND POTASSIUM PHOSPHATE 125 ML/HR: .53; .5; .37; .037; .03; .012; .00082 INJECTION, SOLUTION INTRAVENOUS at 05:45

## 2024-09-10 NOTE — ASSESSMENT & PLAN NOTE
As evidenced by CT exam significant for small bowel distention, history of small bowel obstruction numerous times with need for nonoperative management, nausea and vomiting with diffuse abdominal tenderness, past surgical history of right hemicolectomy secondary to colonic malignancy    1) discussed with original surgical attendings  2) NG tube placement with low continuous suction for decompressive therapy  3) KUB to evaluate position of NG tube  4) maintenance IV fluids with 125 mill per hour pH isolyte  5) n.p.o.  6).  Analgesia  7) DVT prophylaxis  8) I/O  9) ADT21 order for transfer to [facility]

## 2024-09-10 NOTE — PROGRESS NOTES
Progress Note - Surgery-General   Name: Joaquín Gee 34 y.o. male I MRN: [id number]  Unit/Bed#: [address] [place] [address] Date of Admission: 9/9/2024   Date of Service: 9/10/2024 I Hospital Day: 1     Assessment & Plan  Small bowel obstruction due to adhesions (HCC)  As evidenced by CT exam significant for small bowel distention, history of small bowel obstruction numerous times with need for nonoperative management, nausea and vomiting with diffuse abdominal tenderness, past surgical history of right hemicolectomy secondary to colonic malignancy    1) discussed with original surgical attendings  2) NG tube placement with low continuous suction for decompressive therapy  3) KUB to evaluate position of NG tube  4) maintenance IV fluids with 125 mill per hour pH isolyte  5) n.p.o.  6).  Analgesia  7) DVT prophylaxis  8) I/O  9) ADT21 order for transfer to [facility]  S/P right hemicolectomy  Stable, likely underlying etiology of SBO    Surgery-General service will follow. Planning for transfer    History of Present Illness   Patient was seen and examined at bedside.  Patient overnight did have progressively worsening nausea with vomiting of bilious secretions.  Patient opted to have NG tube placed.  Patient's NG tube had minimal output.  Patient is adamant that if surgery was to be explored as an option that he would want to be with his original surgical team.  Patient was educated on the reasons for surgical indication with SBO and benefits/disadvantages.  Patient would like us to reach out to his original surgical team.  Surgical team was reached out to and patient has opted for transfer if they are willing to accept.  Patient currently does not have any increased distention or bloating.  Patient does report that he has diffuse abdominal pain with the most prominent pain in the lower quadrants.  Patient has not been passing gas or having any bowel movements.  Patient with his right side colon history also has  had small bowel obstructions historically.  Patient historically has resolved with nonoperative management.    Objective      Temp:  [97.9 °F (36.6 °C)-98.7 °F (37.1 °C)] 98.7 °F (37.1 °C)  HR:  [53-62] 58  Resp:  [15-18] 16  BP: (118-135)/(60-76) 125/76  O2 Device: None (Room air)          I/O         09/08 0701  09/09 0700 09/09 0701  09/10 0700 09/10 0701  09/11 0700    P.O.  0     I.V. (mL/kg)  10 (0.2)     IV Piggyback  100     Total Intake(mL/kg)  110 (1.9)     Urine (mL/kg/hr)  200 (0.1)     Total Output  200     Net  -90            Unmeasured Urine Occurrence  2 x           Lines/Drains/Airways       Active Status       Name Placement date Placement time Site Days    NG/OG/Enteral Tube 18 Fr Left nare 09/10/24  0030  Left nare  less than 1                  Physical Exam  Vitals reviewed.   Constitutional:       General: He is not in acute distress.     Interventions: He is not intubated.  HENT:      Head: Normocephalic and atraumatic.      Right Ear: Hearing and external ear normal.      Left Ear: Hearing and external ear normal.      Nose: Nose normal.   Cardiovascular:      Rate and Rhythm: Normal rate and regular rhythm.      Heart sounds: Normal heart sounds, S1 normal and S2 normal. Heart sounds not distant. No murmur heard.  Pulmonary:      Effort: Pulmonary effort is normal. No tachypnea, bradypnea, accessory muscle usage, prolonged expiration, respiratory distress or retractions. He is not intubated.      Breath sounds: Normal breath sounds. No stridor, decreased air movement or transmitted upper airway sounds. No decreased breath sounds, wheezing, rhonchi or rales.   Abdominal:      General: Abdomen is flat. There is no distension.      Tenderness: There is generalized abdominal tenderness and tenderness in the right lower quadrant and left lower quadrant. There is no guarding.      Hernia: No hernia is present.   Skin:     General: Skin is warm and dry.      Capillary Refill: Capillary refill takes  less than 2 seconds.   Neurological:      Mental Status: He is alert.          Lab Results: I have reviewed the following results: CBC/BMP:   .     09/10/24  0551   WBC 6.95   HGB 13.0   HCT 40.8      SODIUM 134*   K 4.3   CL 98   CO2 27   BUN 14   CREATININE 1.01   GLUC 105   MG 1.9   PHOS 4.3    , Creatinine Clearance: Estimated Creatinine Clearance: 81.6 mL/min (by C-G formula based on SCr of 1.01 mg/dL)., LFTs:   .     09/10/24  0551   AST 17   ALT 12   ALB 4.1   TBILI 1.78*   ALKPHOS 39      Imaging Review: Reviewed radiology reports from this admission including: CT abdomen/pelvis.  Other Studies: No additional pertinent studies reviewed.    VTE Pharmacologic Prophylaxis: Enoxaparin (Lovenox)  VTE Mechanical Prophylaxis: sequential compression device

## 2024-09-10 NOTE — UTILIZATION REVIEW
Initial Clinical Review    Admission: Date/Time/Statement:   Admission Orders (From admission, onward)       Ordered        09/09/24 0542  INPATIENT ADMISSION  Once                          Orders Placed This Encounter   Procedures    INPATIENT ADMISSION     Standing Status:   Standing     Number of Occurrences:   1     Order Specific Question:   Level of Care     Answer:   Med Surg [16]     Order Specific Question:   Estimated length of stay     Answer:   More than 2 Midnights     Order Specific Question:   Certification     Answer:   I certify that inpatient services are medically necessary for this patient for a duration of greater than two midnights. See H&P and MD Progress Notes for additional information about the patient's course of treatment.     ED Arrival Information       Expected   -    Arrival   9/9/2024 01:54    Acuity   Urgent              Means of arrival   Walk-In    Escorted by   Spouse    Service   Surgery-General    Admission type   Emergency              Arrival complaint   Blockage, nasuea, light headed             Chief Complaint   Patient presents with    Abdominal Pain     Started with l sided abd pain around 1800 with nausea and vomiting, had colon surgery 2 years ago. Hx of sbo, sts feels like he has another obstruction. No pain meds taken       Initial Presentation:   34 yom to ER from home L sided abd pain, nausea, dry heaving. Hx colon resection, SBO. Presents bradycardic, LUQ tenderness, nausea. Admission CT a/p: Small bowel obstruction. The appearance is concerning for possible closed loop obstruction, possibly related to internal hernia and/or volvulus.    Admitted to inpatient status for SBO 2nd adhesions.   This is patient's third episode of obstruction, last episode was year ago. Refusing NGT.    Date: 9/10/24   Day 2:   SBO 2nd adhesions. Vomiting overnight, agreed to NGT. Abd soft, generalized tenderness, no flatus or BM yet. Using IV Dilaudid for pain & IV Zofran for nausea.  IVF maintained.   Plan transfer to [facility] where patient had previous right hemicolectomy for colon cancer       ED Triage Vitals [09/09/24 0216]   Temperature Pulse Respirations Blood Pressure SpO2 Pain Score   98.2 °F (36.8 °C) (!) 53 18 133/61 100 % 4     Weight (last 2 days)       Date/Time Weight    09/09/24 1204 56.5 (124.6)    09/09/24 0216 55.9 (123.2)            Vital Signs (last 3 days)       Date/Time Temp Pulse Resp BP MAP (mmHg) SpO2 O2 Device Patient Position - Orthostatic VS Jose Coma Scale Score Pain    09/10/24 1503 98.6 °F (37 °C) 57 18 125/70 92 100 % None (Room air) Lying -- --    09/10/24 1307 -- -- -- -- -- -- -- -- -- 7    09/10/24 1055 -- -- -- -- -- -- -- -- -- 7    09/10/24 0830 -- -- -- -- -- -- -- -- -- 6    09/10/24 0827 98.7 °F (37.1 °C) 58 16 125/76 -- 98 % None (Room air) Lying -- 6    09/10/24 0554 -- -- -- -- -- -- -- -- -- 10 - Worst Possible Pain    09/10/24 0320 -- -- -- -- -- -- -- -- -- 7    09/10/24 0100 -- -- -- -- -- -- -- -- -- No Pain    09/10/24 0015 98.2 °F (36.8 °C) 58 17 135/75 97 98 % None (Room air) Sitting -- --    09/09/24 2058 -- -- -- -- -- -- -- -- -- 10 - Worst Possible Pain    09/09/24 2056 97.9 °F (36.6 °C) 53 18 135/71 -- 98 % -- -- -- --    09/09/24 2000 -- -- -- -- -- -- None (Room air) -- 15 --    09/09/24 1832 -- -- -- -- -- -- -- -- -- 9    09/09/24 1602 98.3 °F (36.8 °C) 62 15 118/60 74 98 % None (Room air) Lying -- --    09/09/24 1204 98.7 °F (37.1 °C) 52 18 112/68 -- -- -- -- -- --    09/09/24 1127 98.7 °F (37.1 °C) 52 18 112/68 85 98 % None (Room air) Lying -- 6    09/09/24 1036 98.2 °F (36.8 °C) 54 17 125/60 -- 99 % None (Room air) Lying -- 5    09/09/24 0855 -- -- -- -- -- -- -- -- -- 4    09/09/24 0828 -- -- -- -- -- -- -- -- -- 8    09/09/24 0624 98.3 °F (36.8 °C) 56 18 116/56 -- 98 % -- Lying -- --    09/09/24 0423 -- -- -- -- -- -- -- -- -- 6    09/09/24 0338 -- -- -- -- -- -- -- -- 15 --    09/09/24 0229 -- -- -- -- -- -- -- --  -- 4    09/09/24 0216 98.2 °F (36.8 °C) 53 18 133/61 88 100 % -- Lying -- 4              Pertinent Labs/Diagnostic Test Results:   Radiology:  CT abdomen pelvis with contrast   ED Interpretation by Edd Serna MD (09/09 0410)   Abnormal   Per my interpretation, Dilated bowel loops concerned for SBO. Will await formal RADs Reading.      Final Interpretation by Nolvia Zayas MD (09/09 0529)      Small bowel obstruction, as described above. Please see discussion. The appearance is concerning for possible closed loop obstruction, possibly related to internal hernia and/or volvulus. Surgical consultation and follow-up is recommended.         I personally discussed this study with EDD SERNA on 9/9/2024 5:14 AM.               Workstation performed: [id number]         XR chest portable    (Results Pending)       Results from last 7 days   Lab Units 09/10/24  0551 09/09/24  0224   WBC Thousand/uL 6.95 9.99   HEMOGLOBIN g/dL 13.0 13.6   HEMATOCRIT % 40.8 42.5   PLATELETS Thousands/uL 202 200   TOTAL NEUT ABS Thousands/µL  --  7.95*         Results from last 7 days   Lab Units 09/10/24  0551 09/09/24 0224   SODIUM mmol/L 134* 135   POTASSIUM mmol/L 4.3 4.8   CHLORIDE mmol/L 98 101   CO2 mmol/L 27 28   ANION GAP mmol/L 9 6   BUN mg/dL 14 10   CREATININE mg/dL 1.01 1.16   EGFR ml/min/1.73sq m 95 81   CALCIUM mg/dL 9.1 10.0   MAGNESIUM mg/dL 1.9  --    PHOSPHORUS mg/dL 4.3  --      Results from last 7 days   Lab Units 09/10/24  0551   AST U/L 17   ALT U/L 12   ALK PHOS U/L 39   TOTAL PROTEIN g/dL 6.8   ALBUMIN g/dL 4.1   TOTAL BILIRUBIN mg/dL 1.78*         Results from last 7 days   Lab Units 09/10/24  0551 09/09/24  0224   GLUCOSE RANDOM mg/dL 105 102         Results from last 7 days   Lab Units 09/09/24  0802   LACTIC ACID mmol/L 0.6       ED Treatment-Medication Administration from 09/09/2024 0153 to 09/09/2024 1108         Date/Time Order Dose Route Action     09/09/2024 0225 ondansetron (ZOFRAN) injection 4 mg 4  mg Intravenous Given     09/09/2024 0357 iohexol (OMNIPAQUE) 350 MG/ML injection (MULTI-DOSE) 100 mL 100 mL Intravenous Given     09/09/2024 0423 morphine injection 4 mg 4 mg Intravenous Given     09/09/2024 0617 sodium chloride 0.9 % infusion 100 mL/hr Intravenous New Bag     09/09/2024 0828 morphine injection 2 mg 2 mg Intravenous Given            Past Medical History:   Diagnosis Date    SBO (small bowel obstruction) (HCC) 09/10/2024     Present on Admission:   Small bowel obstruction due to adhesions (HCC)      Admitting Diagnosis: Small bowel obstruction (HCC) [K56.609]  Abdominal pain [R10.9]  S/P partial resection of colon [Z90.49]  Age/Sex: 34 y.o. male  Admission Orders:  NPO  Scd/foot pumps  NGT    Scheduled Medications:  Medications 09/01 09/02 09/03 09/04 09/05 09/06 09/07 09/08 09/09 09/10   enoxaparin (LOVENOX) subcutaneous injection 40 mg  Dose: 40 mg  Freq: Daily Route: SC  Start: 09/09/24 1145   Admin Instructions:               (2134) (3649)        morphine injection 2 mg  Dose: 2 mg  Freq: Once Route: IV  Start: 09/09/24 0830 End: 09/09/24 0828   Admin Instructions:               0828         morphine injection 4 mg  Dose: 4 mg  Freq: Once Route: IV  Start: 09/09/24 0830 End: 09/09/24 0818   Admin Instructions:               0818-D/C'd       morphine injection 4 mg  Dose: 4 mg  Freq: Once Route: IV  Start: 09/09/24 0415 End: 09/09/24 0423   Admin Instructions:               0423         morphine injection 4 mg  Dose: 4 mg  Freq: Once Route: IV  Start: 09/09/24 0230 End: 09/09/24 0414   Admin Instructions:               (2456) 2277-D/C'd       ondansetron (ZOFRAN) injection 4 mg  Dose: 4 mg  Freq: Once Route: IV  Start: 09/09/24 0230 End: 09/09/24 0225   Admin Instructions:               0225                     Continuous Meds Sorted by Name  for Chasenavdeepjames Joaquín as of 09/01/24 through 9/10/24  Legend:       Medications 09/01 09/02 09/03 09/04 09/05 09/06 09/07 09/08 09/09 09/10    multi-electrolyte (PLASMALYTE-A/ISOLYTE-S PH 7.4) IV solution  Rate: 125 mL/hr Dose: 125 mL/hr  Freq: Continuous Route: IV  Last Dose: 125 mL/hr (09/10/24 0545)  Start: 09/09/24 1145            1208     2049      0545        sodium chloride 0.9 % infusion  Rate: 100 mL/hr Dose: 100 mL/hr  Freq: Continuous Route: IV  Indications of Use: IV Hydration  Last Dose: 100 mL/hr (09/09/24 0617)  Start: 09/09/24 0600 End: 09/09/24 1132 0617     1132-D/C'd       Legend:       [id number]/0109/0209/0309/0409/0509/0609/0709/0809/0909/10        PRN Meds Sorted by Name  for Joauqín Gee as of 09/01/24 through 9/10/24  Legend:       Medications 09/01 09/02 09/03 09/04 09/05 09/06 09/07 09/08 09/09 09/10   acetaminophen (Ofirmev) injection 1,000 mg  Dose: 1,000 mg  Freq: Every 6 hours PRN Route: IV  PRN Reason: moderate pain  Start: 09/09/24 1132   Admin Instructions:               1208     1223         HYDROmorphone (DILAUDID) injection 0.5 mg  Dose: 0.5 mg  Freq: Every 4 hours PRN Route: IV  PRN Reason: severe pain  Start: 09/09/24 1132   Admin Instructions:               1832      0045     0554     1055        HYDROmorphone HCl (DILAUDID) injection 0.2 mg  Dose: 0.2 mg  Freq: Every 4 hours PRN Route: IV  PRN Reason: breakthrough pain  Start: 09/09/24 1132   Admin Instructions:               2058      0320     0830     1307        iohexol (OMNIPAQUE) 350 MG/ML injection (MULTI-DOSE) 100 mL  Dose: 100 mL  Freq: Once in imaging Route: IV  PRN Reason: contrast  Start: 09/09/24 0357 End: 09/09/24 0357 0357         ondansetron (ZOFRAN) injection 4 mg  Dose: 4 mg  Freq: Every 6 hours PRN Route: IV  PRN Reasons: nausea,vomiting  Start: 09/09/24 1132   Admin Instructions:               6558 [id number]                      Network Utilization Review Department  ATTENTION: Please call with any questions or concerns to [phone] and carefully listen to the prompts so that you are directed to the  right person. All voicemails are confidential.   For Discharge needs, contact Care Management DC Support Team at [phone] opt. 2  Send all requests for admission clinical reviews, approved or denied determinations and any other requests to dedicated fax number below belonging to the campus where the patient is receiving treatment. List of dedicated fax numbers for the Facilities:  FACILITY NAME UR FAX NUMBER   ADMISSION DENIALS (Administrative/Medical Necessity) [phone]   DISCHARGE SUPPORT TEAM (NETWORK) [phone]   PARENT CHILD HEALTH (Maternity/NICU/Pediatrics) [phone]   [facility] [phone]   [facility] [phone]   [facility] [phone]   [facility] [phone]   [facility] [phone]   [facility] [phone]   [facility] [phone]   [facility] [phone]   [facility] [phone]   [facility] [phone]   [facility] [phone]   [facility] [phone]

## 2024-09-10 NOTE — PLAN OF CARE
Problem: PAIN - ADULT  Goal: Verbalizes/displays adequate comfort level or baseline comfort level  Description: Interventions:  - Encourage patient to monitor pain and request assistance  - Assess pain using appropriate pain scale  - Administer analgesics based on type and severity of pain and evaluate response  - Implement non-pharmacological measures as appropriate and evaluate response  - Consider cultural and social influences on pain and pain management  - Notify physician/advanced practitioner if interventions unsuccessful or patient reports new pain  Outcome: Progressing     Problem: SAFETY ADULT  Goal: Patient will remain free of falls  Description: INTERVENTIONS:  - Educate patient/family on patient safety including physical limitations  - Instruct patient to call for assistance with activity   - Consult OT/PT to assist with strengthening/mobility   - Keep Call bell within reach  - Keep bed low and locked with side rails adjusted as appropriate  - Keep care items and personal belongings within reach  - Initiate and maintain comfort rounds  - Make Fall Risk Sign visible to staff  - Offer Toileting every 2 Hours, in advance of need  - Initiate/Maintain bed/chair alarm  - Obtain necessary fall risk management equipment: bracelet/socks   - Apply yellow socks and bracelet for high fall risk patients  - Consider moving patient to room near nurses station  Outcome: Progressing  Goal: Maintain or return to baseline ADL function  Description: INTERVENTIONS:  -  Assess patient's ability to carry out ADLs; assess patient's baseline for ADL function and identify physical deficits which impact ability to perform ADLs (bathing, care of mouth/teeth, toileting, grooming, dressing, etc.)  - Assess/evaluate cause of self-care deficits   - Assess range of motion  - Assess patient's mobility; develop plan if impaired  - Assess patient's need for assistive devices and provide as appropriate  - Encourage maximum independence  but intervene and supervise when necessary  - Involve family in performance of ADLs  - Assess for home care needs following discharge   - Consider OT consult to assist with ADL evaluation and planning for discharge  - Provide patient education as appropriate  Outcome: Progressing  Goal: Maintains/Returns to pre admission functional level  Description: INTERVENTIONS:  - Perform AM-PAC 6 Click Basic Mobility/ Daily Activity assessment daily.  - Set and communicate daily mobility goal to care team and patient/family/caregiver.   - Collaborate with rehabilitation services on mobility goals if consulted  - Perform Range of Motion 12 times a day.  - Reposition patient every 2 hours.  - Dangle patient 3 times a day  - Stand patient 3 times a day  - Ambulate patient 3 times a day  - Out of bed to chair 3 times a day   - Out of bed for meals 3 times a day  - Out of bed for toileting  - Record patient progress and toleration of activity level   Outcome: Progressing     Problem: DISCHARGE PLANNING  Goal: Discharge to home or other facility with appropriate resources  Description: INTERVENTIONS:  - Identify barriers to discharge w/patient and caregiver  - Arrange for needed discharge resources and transportation as appropriate  - Identify discharge learning needs (meds, wound care, etc.)  - Arrange for interpretive services to assist at discharge as needed  - Refer to Case Management Department for coordinating discharge planning if the patient needs post-hospital services based on physician/advanced practitioner order or complex needs related to functional status, cognitive ability, or social support system  Outcome: Progressing     Problem: Knowledge Deficit  Goal: Patient/family/caregiver demonstrates understanding of disease process, treatment plan, medications, and discharge instructions  Description: Complete learning assessment and assess knowledge base.  Interventions:  - Provide teaching at level of understanding  -  Provide teaching via preferred learning methods  Outcome: Progressing

## 2024-09-10 NOTE — PROGRESS NOTES
"Progress Note - General Surgery   Joaquín Gee 34 y.o. male MRN: [id number]  Unit/Bed#: [address] [place] [address] Encounter: [id number]    Assessment & Plan      Partial SBO secondary to prior R hemicolectomy in 2021  - Educated patient (and wife via speaker phone) on the various treatment options as well as the likely etiology of his current condition.   - Pt agreed to continue with conservative care for the next 24 hours or so in hopes that obstruction resolves spontaneously.  - Plan to contact Dr. Reji Soliz at [facility] to provide update on mutual patient's current status and discuss possible transfer pending patient's progression.    CMP on 9/10/2024:  Mg- 1.9   Phos- 4.3   T bili- 1.78  CBC on 9/10/2024:   WBC- 6.95 (9.99)   RBC - 5.63 (5.82)    MCV- 73 (73)   MCHC- 23.1 (23.4)     /75 (BP Location: Left arm)   Pulse 58   Temp 98.2 °F (36.8 °C) (Temporal)   Resp 17   Ht 5' 6\" (1.676 m)   Wt 56.5 kg (124 lb 9.6 oz)   SpO2 98%   BMI 20.11 kg/m²     Results from last 7 days   Lab Units 09/10/24  0551   WBC Thousand/uL 6.95   HEMOGLOBIN g/dL 13.0   HEMATOCRIT % 40.8   PLATELETS Thousands/uL 202     Results from last 7 days   Lab Units 09/10/24  0551   POTASSIUM mmol/L 4.3   CHLORIDE mmol/L 98   CO2 mmol/L 27   BUN mg/dL 14   CREATININE mg/dL 1.01     Results from last 7 days   Lab Units 09/10/24  0551   CALCIUM mg/dL 9.1   MAGNESIUM mg/dL 1.9   PHOSPHORUS mg/dL 4.3       Intake/Output Summary (Last 24 hours) at 9/10/2024 0815  Last data filed at 9/9/2024 1701  Gross per 24 hour   Intake 110 ml   Output 200 ml   Net -90 ml           Subjective/Objective     Subjective: Joaquín Castillo is a 33 y/o male with a history of colon cancer s/p R colon resection (2021) admitted for abdominal pain on 9/9/2024. Patient reports history of prior small bowel obstructions, occurring approximately once/year since the resection in 2021. The pt initially refused NG tube yesterday (9/9) as he had a BM earlier " in the day and because his abdomen was not distended/bloated. Per patient, abdominal pain and nausea continued to worsen, and he vomited green bilious fluid around 11:00pm. An NG tube was subsequently placed and minimal bilious fluid was collected (output not yet recorded). Pt's pain has minimally improved since placement of NG tube.     He has not had a BM since yesterday and he has not been passing gas. Pt expressed that, if surgery is indicated, he would like to pursue care with his previous colon surgeon Dr. Reji Soliz in the [facility] [place].     CT scan from 9/9/2024 revealed multiple dilated loops of small bowel (up to 4 cm) with mild intestinal wall thickening, as well as swirling of and engorgement of the mesenteric vessels. of Results also revealed stomach distended with fluid and scattered air-fluid levels.    Labs are generally unchanged since admission yesterday.     Review of Systems   Constitutional:  Positive for appetite change.   Gastrointestinal:  Positive for abdominal pain (diffuse), constipation (no BM since yesterday), nausea and vomiting. Negative for abdominal distention.     Objective:     Physical Exam  Constitutional:       Appearance: Normal appearance.   HENT:      Head: Normocephalic and atraumatic.   Cardiovascular:      Rate and Rhythm: Normal rate and regular rhythm.      Heart sounds: No murmur heard.     No gallop.   Pulmonary:      Effort: Pulmonary effort is normal.      Breath sounds: Normal breath sounds.   Abdominal:      General: Abdomen is flat. A surgical scar is present. Bowel sounds are decreased.      Tenderness: There is abdominal tenderness.   Musculoskeletal:         General: Normal range of motion.      Cervical back: Normal range of motion.   Skin:     General: Skin is warm and dry.   Neurological:      General: No focal deficit present.      Mental Status: He is alert.   Psychiatric:         Mood and Affect: Mood normal.         Behavior: Behavior normal.             SRAVAN Fraga  9/10/2024

## 2024-09-10 NOTE — PLAN OF CARE
Problem: PAIN - ADULT  Goal: Verbalizes/displays adequate comfort level or baseline comfort level  Description: Interventions:  - Encourage patient to monitor pain and request assistance  - Assess pain using appropriate pain scale  - Administer analgesics based on type and severity of pain and evaluate response  - Implement non-pharmacological measures as appropriate and evaluate response  - Consider cultural and social influences on pain and pain management  - Notify physician/advanced practitioner if interventions unsuccessful or patient reports new pain  Outcome: Progressing     Problem: SAFETY ADULT  Goal: Patient will remain free of falls  Description: INTERVENTIONS:  - Educate patient/family on patient safety including physical limitations  - Instruct patient to call for assistance with activity   - Consult OT/PT to assist with strengthening/mobility   - Keep Call bell within reach  - Keep bed low and locked with side rails adjusted as appropriate  - Keep care items and personal belongings within reach  - Initiate and maintain comfort rounds  - Make Fall Risk Sign visible to staff  - Apply yellow socks and bracelet for high fall risk patients  - Consider moving patient to room near nurses station  Outcome: Progressing  Goal: Maintain or return to baseline ADL function  Description: INTERVENTIONS:  -  Assess patient's ability to carry out ADLs; assess patient's baseline for ADL function and identify physical deficits which impact ability to perform ADLs (bathing, care of mouth/teeth, toileting, grooming, dressing, etc.)  - Assess/evaluate cause of self-care deficits   - Assess range of motion  - Assess patient's mobility; develop plan if impaired  - Assess patient's need for assistive devices and provide as appropriate  - Encourage maximum independence but intervene and supervise when necessary  - Involve family in performance of ADLs  - Assess for home care needs following discharge   - Consider OT consult  to assist with ADL evaluation and planning for discharge  - Provide patient education as appropriate  Outcome: Progressing  Goal: Maintains/Returns to pre admission functional level  Description: INTERVENTIONS:  - Perform AM-PAC 6 Click Basic Mobility/ Daily Activity assessment daily.  - Set and communicate daily mobility goal to care team and patient/family/caregiver.   - Collaborate with rehabilitation services on mobility goals if consulted  - Out of bed for toileting  - Record patient progress and toleration of activity level   Outcome: Progressing    Problem: DISCHARGE PLANNING  Goal: Discharge to home or other facility with appropriate resources  Description: INTERVENTIONS:  - Identify barriers to discharge w/patient and caregiver  - Arrange for needed discharge resources and transportation as appropriate  - Identify discharge learning needs (meds, wound care, etc.)  - Arrange for interpretive services to assist at discharge as needed  - Refer to Case Management Department for coordinating discharge planning if the patient needs post-hospital services based on physician/advanced practitioner order or complex needs related to functional status, cognitive ability, or social support system  Outcome: Progressing     Problem: Knowledge Deficit  Goal: Patient/family/caregiver demonstrates understanding of disease process, treatment plan, medications, and discharge instructions  Description: Complete learning assessment and assess knowledge base.  Interventions:  - Provide teaching at level of understanding  - Provide teaching via preferred learning methods  Outcome: Progressing

## 2024-09-11 NOTE — NURSING NOTE
Pt transfer to [facility], left the unit via transportation at about 2300, report given to the Nurse (Audelia) at [facility]. Left with IV R/AC 20G and NG tube to the L/nare.

## 2024-09-13 NOTE — DISCHARGE SUMMARY
Discharge Summary - Surgery-General   Name: Joaquín Gee 34 y.o. male I MRN: [id number]  Unit/Bed#: [address] [address] I Date of Admission: 9/9/2024   Date of Service: 9/13/2024 I Hospital Day: 1     Admission Date: 9/9/2024 0213  Discharge Date: 09/13/24  Admitting Diagnosis: Small bowel obstruction (HCC) [K56.609]  Abdominal pain [R10.9]  S/P partial resection of colon [Z90.49]  Discharge Diagnosis:   Medical Problems      Per Luis Navarro 9/9/24  HPI: This is a 34-year-old male past medical history of: CA status post right colon resection in 2021 presenting with left lower quadrant abdominal pain with associated nausea vomiting.  Patient reports history of small bowel obstruction x 2.  Most recent obstruction earlier this year.     CT a/p:   Prior partial colectomy. Anastomotic sutures are present in the right mid to lower abdomen. The stomach is distended with fluid. There are multiple loops of abnormally dilated fluid-filled small bowel, measuring up to 4 cm diameter.   There is some early fecalization of small bowel contents in the right upper to mid abdomen. Some of the small bowel loops demonstrate mild wall thickening and mild hyperemia. Scattered air-fluid levels are present. There appears to be a transition zone in the left mid abdomen (series 2 image 81, series 601 image 51). There also appears to be another transition zone in the right mid abdomen (series 2 image 91-99, series 601 image 48-36). There is swirling of the mesenteric vessels slightly to the left of midline. The mesenteric vessels appear engorged. There is no definite evidence of pneumatosis intestinalis.      No leukocytosis. AVSS.     Procedures Performed: No orders of the defined types were placed in this encounter.      Summary of Hospital Course: Patient presented 9/9 secondary to the fact that he was having abdominal pain with nausea and vomiting.  Patient had CT exam that was suggestive of small bowel obstruction.  Patient had  had a history of multiple small bowel obstructions.  Patient did have pertinent surgical history with colectomy on the right side which subsequently has led to small bowel obstructions which have resolved on their own.  Patient not typically required need of an NG tube.  Patient was admitted under the general surgery service for nonoperative management.  Patient had IV fluids, electrolyte repletion, recommended NG tube but deferred, n.p.o. status, as needed analgesia, as needed antiemetic.  Hospital day 19/10/24 patient overnight had nausea and vomiting and opted for NG tube.  Patient had NG tube continued on low continuous suction but a low output so was confirmed placement with chest x-ray.  NG tube in correct position.  Patient had labs reviewed and as needed analgesia/as needed antiemetic ordered.  Patient was continued NPO.  Discussions with family and patient suggested that patient did not want to explore any form of surgical intervention if needed at this facility.  Patient was refractory over the first 24 hours of resolving SBO.  Through conversations patient wanted us to contact prior surgeon.  Upon conversations it was confirmed that patient would be transferred to [facility].  Patient was transferred 9/10/2024 in the evening.    Significant Findings, Care, Treatment and Services Provided: SBO secondary to adhesions and prior surgical history.  CT of abdomen and pelvis with contrast, chest x-ray for confirmation of NG tube, routine labs, electrolyte repletion as needed, n.p.o. with NG tube management, significant communication with family and prior surgeon, transfer patient to preferred facility    Complications: none    Condition at Discharge: stable       Discharge instructions/Information to patient and family:   See After Visit Summary (AVS) for information provided to patient and family.      Provisions for Follow-Up Care:  See after visit summary for information related to follow-up care  and any pertinent home health orders.      PCP: No primary care provider on file.    Disposition:  transferred to [facility]    Planned Readmission: Yes transferred to [facility]     Discharge Medications:  See after visit summary for reconciled discharge medications provided to patient and family.      Discharge Statement:  I have spent a total time of 10 minutes in caring for this patient on the day of the visit/encounter. >30 minutes of time was spent on: Diagnostic results, Prognosis, Risks and benefits of tx options, Instructions for management, Impressions, and Counseling / Coordination of care.

## 2025-05-28 ENCOUNTER — HOSPITAL ENCOUNTER (EMERGENCY)
Facility: HOSPITAL | Age: 35
Discharge: HOME | End: 2025-05-28
Payer: COMMERCIAL

## 2025-05-28 ENCOUNTER — APPOINTMENT (EMERGENCY)
Dept: RADIOLOGY | Facility: HOSPITAL | Age: 35
End: 2025-05-28
Payer: COMMERCIAL

## 2025-05-28 VITALS
TEMPERATURE: 97 F | BODY MASS INDEX: 22.92 KG/M2 | OXYGEN SATURATION: 100 % | HEART RATE: 57 BPM | SYSTOLIC BLOOD PRESSURE: 116 MMHG | WEIGHT: 142 LBS | DIASTOLIC BLOOD PRESSURE: 63 MMHG | RESPIRATION RATE: 18 BRPM

## 2025-05-28 DIAGNOSIS — R10.9 ABDOMINAL PAIN: Primary | ICD-10-CM

## 2025-05-28 DIAGNOSIS — R11.0 NAUSEA: ICD-10-CM

## 2025-05-28 LAB
ALBUMIN SERPL BCG-MCNC: 4.4 G/DL (ref 3.5–5)
ALP SERPL-CCNC: 59 U/L (ref 34–104)
ALT SERPL W P-5'-P-CCNC: 19 U/L (ref 7–52)
ANION GAP SERPL CALCULATED.3IONS-SCNC: 9 MMOL/L (ref 4–13)
AST SERPL W P-5'-P-CCNC: 31 U/L (ref 13–39)
BASOPHILS # BLD AUTO: 0.04 THOUSANDS/ÂΜL (ref 0–0.1)
BASOPHILS NFR BLD AUTO: 1 % (ref 0–1)
BILIRUB SERPL-MCNC: 0.64 MG/DL (ref 0.2–1)
BUN SERPL-MCNC: 16 MG/DL (ref 5–25)
CALCIUM SERPL-MCNC: 9.2 MG/DL (ref 8.4–10.2)
CHLORIDE SERPL-SCNC: 102 MMOL/L (ref 96–108)
CO2 SERPL-SCNC: 23 MMOL/L (ref 21–32)
CREAT SERPL-MCNC: 1.27 MG/DL (ref 0.6–1.3)
EOSINOPHIL # BLD AUTO: 0.04 THOUSAND/ÂΜL (ref 0–0.61)
EOSINOPHIL NFR BLD AUTO: 1 % (ref 0–6)
ERYTHROCYTE [DISTWIDTH] IN BLOOD BY AUTOMATED COUNT: 14.9 % (ref 11.6–15.1)
GFR SERPL CREATININE-BSD FRML MDRD: 72 ML/MIN/1.73SQ M
GLUCOSE SERPL-MCNC: 89 MG/DL (ref 65–140)
HCT VFR BLD AUTO: 39.7 % (ref 36.5–49.3)
HGB BLD-MCNC: 12.8 G/DL (ref 12–17)
IMM GRANULOCYTES # BLD AUTO: 0.02 THOUSAND/UL (ref 0–0.2)
IMM GRANULOCYTES NFR BLD AUTO: 0 % (ref 0–2)
LIPASE SERPL-CCNC: 13 U/L (ref 11–82)
LYMPHOCYTES # BLD AUTO: 1.43 THOUSANDS/ÂΜL (ref 0.6–4.47)
LYMPHOCYTES NFR BLD AUTO: 22 % (ref 14–44)
MCH RBC QN AUTO: 22.7 PG (ref 26.8–34.3)
MCHC RBC AUTO-ENTMCNC: 32.2 G/DL (ref 31.4–37.4)
MCV RBC AUTO: 70 FL (ref 82–98)
MONOCYTES # BLD AUTO: 0.82 THOUSAND/ÂΜL (ref 0.17–1.22)
MONOCYTES NFR BLD AUTO: 13 % (ref 4–12)
NEUTROPHILS # BLD AUTO: 4.16 THOUSANDS/ÂΜL (ref 1.85–7.62)
NEUTS SEG NFR BLD AUTO: 63 % (ref 43–75)
NRBC BLD AUTO-RTO: 0 /100 WBCS
PLATELET # BLD AUTO: 192 THOUSANDS/UL (ref 149–390)
PMV BLD AUTO: 10.9 FL (ref 8.9–12.7)
POTASSIUM SERPL-SCNC: 4 MMOL/L (ref 3.5–5.3)
PROT SERPL-MCNC: 7.1 G/DL (ref 6.4–8.4)
RBC # BLD AUTO: 5.64 MILLION/UL (ref 3.88–5.62)
SODIUM SERPL-SCNC: 134 MMOL/L (ref 135–147)
WBC # BLD AUTO: 6.51 THOUSAND/UL (ref 4.31–10.16)

## 2025-05-28 PROCEDURE — 99284 EMERGENCY DEPT VISIT MOD MDM: CPT

## 2025-05-28 PROCEDURE — 99285 EMERGENCY DEPT VISIT HI MDM: CPT

## 2025-05-28 PROCEDURE — 85025 COMPLETE CBC W/AUTO DIFF WBC: CPT

## 2025-05-28 PROCEDURE — 80053 COMPREHEN METABOLIC PANEL: CPT

## 2025-05-28 PROCEDURE — 74177 CT ABD & PELVIS W/CONTRAST: CPT

## 2025-05-28 PROCEDURE — 36415 COLL VENOUS BLD VENIPUNCTURE: CPT

## 2025-05-28 PROCEDURE — 83690 ASSAY OF LIPASE: CPT

## 2025-05-28 RX ORDER — ONDANSETRON 4 MG/1
4 TABLET, FILM COATED ORAL EVERY 6 HOURS
Qty: 12 TABLET | Refills: 0 | Status: SHIPPED | OUTPATIENT
Start: 2025-05-28

## 2025-05-28 RX ADMIN — IOHEXOL 50 ML: 240 INJECTION, SOLUTION INTRATHECAL; INTRAVASCULAR; INTRAVENOUS; ORAL at 11:08

## 2025-05-28 RX ADMIN — IOHEXOL 100 ML: 350 INJECTION, SOLUTION INTRAVENOUS at 12:51

## 2025-05-28 NOTE — Clinical Note
Joaquín Gee was seen and treated in our emergency department on 5/28/2025.                Diagnosis:     Joaquín  .    He may return on this date: 05/29/2025         If you have any questions or concerns, please don't hesitate to call.      Taj Mcconnell MD    ______________________________           _______________          _______________  Hospital Representative                              Date                                Time

## 2025-05-30 NOTE — ED PROVIDER NOTES
Time reflects when diagnosis was documented in both MDM as applicable and the Disposition within this note       Time User Action Codes Description Comment    5/28/2025  1:50 PM Yokubaitis, Taj Add [R10.9] Abdominal pain     5/28/2025  1:50 PM Yokubaitis, Taj Add [R11.0] Nausea           ED Disposition       ED Disposition   Discharge    Condition   Stable    Date/Time   Wed May 28, 2025  1:50 PM    Comment   Joaquín Valentejosueon discharge to home/self care.                   Assessment & Plan       Medical Decision Making  35-year-old male present emergency department due to abdominal pain and concerns for potential small bowel obstruction.  Could be enteritis, gastritis, among other benign etiologies.  However, given history will obtain labs and imaging to evaluate.  Workup ultimately reassuring with no findings to suggest obstruction at this time.  Discussed findings with patient, offered symptomatic management which he declined.  Patient opting for further home care and outpatient follow-up.  Discharged with return precautions.     Amount and/or Complexity of Data Reviewed  Labs: ordered.  Radiology: ordered.    Risk  Prescription drug management.             Medications   iohexol (OMNIPAQUE) 240 MG/ML solution 50 mL (50 mL Oral Given 5/28/25 1108)   iohexol (OMNIPAQUE) 350 MG/ML injection (MULTI-DOSE) 100 mL (100 mL Intravenous Given 5/28/25 1251)       ED Risk Strat Scores                    No data recorded        SBIRT 20yo+      Flowsheet Row Most Recent Value   Initial Alcohol Screen: US AUDIT-C     1. How often do you have a drink containing alcohol? 0 Filed at: 05/28/2025 1100   2. How many drinks containing alcohol do you have on a typical day you are drinking?  0 Filed at: 05/28/2025 1100   3a. Male UNDER 65: How often do you have five or more drinks on one occasion? 0 Filed at: 05/28/2025 1100   Audit-C Score 0 Filed at: 05/28/2025 1100   JOURDAN: How many times in the past year have you...    Used an  illegal drug or used a prescription medication for non-medical reasons? Never Filed at: 05/28/2025 1100                            History of Present Illness       Chief Complaint   Patient presents with    Abdominal Pain     Patient states he had colon surgery in 2021 and ever since had multiple bowel obstructions. Abd pain and nausea for 2 days, concerned for SBO.        Past Medical History[1]   Past Surgical History[2]   Family History[3]   Social History[4]   E-Cigarette/Vaping    E-Cigarette Use Current Every Day User       E-Cigarette/Vaping Substances    Nicotine Yes       I have reviewed and agree with the history as documented.     35-year-old male with history of colon cancer status postresection and 1 complication of small bowel obstruction requiring NG tube and medical management, presents emergency department due to acute worsening abdominal pain over the past 2 days.  Notes that this feels similar to when he had a bowel obstruction in the past.  Has had some nausea without vomiting.  Has had decreased bowel movements but notes that he did have 1 in the past 2 days.  Denies fevers, chest pain, shortness of breath, urinary symptoms, or other complaints.        Review of Systems   All other systems reviewed and are negative.          Objective       ED Triage Vitals   Temperature Pulse Blood Pressure Respirations SpO2 Patient Position - Orthostatic VS   05/28/25 1031 05/28/25 1031 05/28/25 1031 05/28/25 1031 05/28/25 1031 05/28/25 1031   (!) 97 °F (36.1 °C) 57 116/63 18 100 % Sitting      Temp Source Heart Rate Source BP Location FiO2 (%) Pain Score    05/28/25 1031 05/28/25 1031 05/28/25 1031 -- 05/28/25 1109    Oral Monitor Right arm  No Pain      Vitals      Date and Time Temp Pulse SpO2 Resp BP Pain Score FACES Pain Rating User   05/28/25 1109 -- -- -- -- -- No Pain intermittent abd pain with no current pain -- SS   05/28/25 1031 97 °F (36.1 °C) 57 100 % 18 116/63 -- -- OE            Physical  Exam  Constitutional:       General: He is not in acute distress.     Appearance: Normal appearance. He is not ill-appearing or toxic-appearing.   HENT:      Head: Normocephalic and atraumatic.      Mouth/Throat:      Mouth: Mucous membranes are moist.     Eyes:      Extraocular Movements: Extraocular movements intact.     Pulmonary:      Effort: Pulmonary effort is normal.   Abdominal:      General: There is no distension.      Palpations: Abdomen is soft.      Tenderness: There is abdominal tenderness.     Skin:     General: Skin is warm.     Neurological:      Mental Status: He is alert.     Psychiatric:         Mood and Affect: Mood normal.         Results Reviewed       Procedure Component Value Units Date/Time    Comprehensive metabolic panel [[id number]]  (Abnormal) Collected: 05/28/25 1100    Lab Status: Final result Specimen: Blood from Arm, Left Updated: 05/28/25 1131     Sodium 134 mmol/L      Potassium 4.0 mmol/L      Chloride 102 mmol/L      CO2 23 mmol/L      ANION GAP 9 mmol/L      BUN 16 mg/dL      Creatinine 1.27 mg/dL      Glucose 89 mg/dL      Calcium 9.2 mg/dL      AST 31 U/L      ALT 19 U/L      Alkaline Phosphatase 59 U/L      Total Protein 7.1 g/dL      Albumin 4.4 g/dL      Total Bilirubin 0.64 mg/dL      eGFR 72 ml/min/1.73sq m     Narrative:      National Kid[employer] guidelines for Chronic Kidney Disease (CKD):     Stage 1 with normal or high GFR (GFR > 90 mL/min/1.73 square meters)    Stage 2 Mild CKD (GFR = 60-89 mL/min/1.73 square meters)    Stage 3A Moderate CKD (GFR = 45-59 mL/min/1.73 square meters)    Stage 3B Moderate CKD (GFR = 30-44 mL/min/1.73 square meters)    Stage 4 Severe CKD (GFR = 15-29 mL/min/1.73 square meters)    Stage 5 End Stage CKD (GFR <15 mL/min/1.73 square meters)  Note: GFR calculation is accurate only with a steady state creatinine    Lipase [[id number]]  (Normal) Collected: 05/28/25 1100    Lab Status: Final result Specimen: Blood from Arm, Left  Updated: 05/28/25 1131     Lipase 13 u/L     CBC and differential [[id number]]  (Abnormal) Collected: 05/28/25 1100    Lab Status: Final result Specimen: Blood from Arm, Left Updated: 05/28/25 1107     WBC 6.51 Thousand/uL      RBC 5.64 Million/uL      Hemoglobin 12.8 g/dL      Hematocrit 39.7 %      MCV 70 fL      MCH 22.7 pg      MCHC 32.2 g/dL      RDW 14.9 %      MPV 10.9 fL      Platelets 192 Thousands/uL      nRBC 0 /100 WBCs      Segmented % 63 %      Immature Grans % 0 %      Lymphocytes % 22 %      Monocytes % 13 %      Eosinophils Relative 1 %      Basophils Relative 1 %      Absolute Neutrophils 4.16 Thousands/µL      Absolute Immature Grans 0.02 Thousand/uL      Absolute Lymphocytes 1.43 Thousands/µL      Absolute Monocytes 0.82 Thousand/µL      Eosinophils Absolute 0.04 Thousand/µL      Basophils Absolute 0.04 Thousands/µL             CT abdomen pelvis with contrast   Final Interpretation by Jojo Aldridge MD (05/28 0669)      Postsurgical changes from partial colectomy. Oral contrast in the small and large bowel to the level of the rectum. No evidence for dilated loops of small bowel to suggest obstruction. Redemonstrated mesenteric swirling which likely reflects    postoperative changes and is similar to prior CT.      Workstation performed: [id number]             Procedures    ED Medication and Procedure Management   None     Discharge Medication List as of 5/28/2025  1:52 PM        START taking these medications    Details   ondansetron (ZOFRAN) 4 mg tablet Take 1 tablet (4 mg total) by mouth every 6 (six) hours, Starting Wed 5/28/2025, Normal           No discharge procedures on file.  ED SEPSIS DOCUMENTATION   Time reflects when diagnosis was documented in both MDM as applicable and the Disposition within this note       Time User Action Codes Description Comment    5/28/2025  1:50 PM Yokubaitis, Taj Add [R10.9] Abdominal pain     5/28/2025  1:50 PM Yokubaitis, Taj Add [R11.0] Nausea                     [1]   Past Medical History:  Diagnosis Date    SBO (small bowel obstruction) (HCC) 09/10/2024   [2]   Past Surgical History:  Procedure Laterality Date    COLON SURGERY      Dr. Reji Soliz [facility] [phone]   [3] No family history on file.  [4]   Social History  Tobacco Use    Smoking status: Never   Vaping Use    Vaping status: Every Day    Substances: Nicotine   Substance Use Topics    Alcohol use: Yes     Comment: occ    Drug use: Not Currently     Types: Marijuana        Taj Mcconnell MD  05/30/25 1600